# Patient Record
Sex: FEMALE | Race: WHITE | Employment: STUDENT | URBAN - METROPOLITAN AREA
[De-identification: names, ages, dates, MRNs, and addresses within clinical notes are randomized per-mention and may not be internally consistent; named-entity substitution may affect disease eponyms.]

---
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
## 2024-02-01 RX ORDER — DEXTROAMPHETAMINE SACCHARATE, AMPHETAMINE ASPARTATE MONOHYDRATE, DEXTROAMPHETAMINE SULFATE AND AMPHETAMINE SULFATE 2.5; 2.5; 2.5; 2.5 MG/1; MG/1; MG/1; MG/1
10 CAPSULE, EXTENDED RELEASE ORAL EVERY MORNING
Qty: 30 CAPSULE | Refills: 0 | OUTPATIENT
Start: 2024-02-01

## 2024-02-01 NOTE — TELEPHONE ENCOUNTER
Pharmacy    [facility] - [place] - [id number] [phone], [phone]        Disp Refills Start End    amphetamine-dextroamphetamine ER 10 MG Oral Capsule SR 24 Hr 30 capsule 0 1/31/2024 --    Sig - Route: Take 1 capsule (10 mg total) by mouth every morning. - Oral    Sent to pharmacy as: Amphetamine-Dextroamphet ER 10 MG Oral Capsule Extended Release 24 Hour (Adderall XR)    Earliest Fill Date: 1/31/2024    E-Prescribing Status: Receipt confirmed by pharmacy (1/31/2024  8:25 PM CST)

## 2024-02-26 DIAGNOSIS — F90.9 ATTENTION DEFICIT HYPERACTIVITY DISORDER (ADHD), UNSPECIFIED ADHD TYPE: ICD-10-CM

## 2024-02-27 RX ORDER — DEXTROAMPHETAMINE SACCHARATE, AMPHETAMINE ASPARTATE MONOHYDRATE, DEXTROAMPHETAMINE SULFATE AND AMPHETAMINE SULFATE 2.5; 2.5; 2.5; 2.5 MG/1; MG/1; MG/1; MG/1
10 CAPSULE, EXTENDED RELEASE ORAL EVERY MORNING
Qty: 30 CAPSULE | Refills: 0 | Status: SHIPPED | OUTPATIENT
Start: 2024-02-27 | End: 2024-03-04

## 2024-02-27 NOTE — TELEPHONE ENCOUNTER
This medication could not be e-scribed likely due to the nationwide [employer]  issue announced on 2/22/24.    Provider, please print the prescription instead.   Clinical staff, please let the patient know once the printed rx is available for  at your office.      Please review; protocol failed/no protocol.     Requested Prescriptions   Pending Prescriptions Disp Refills    amphetamine-dextroamphetamine ER 10 MG Oral Capsule SR 24 Hr 30 capsule 0     Sig: Take 1 capsule (10 mg total) by mouth every morning.       Controlled Substance Medication Failed - 2/26/2024 11:00 AM        Failed - This medication is a controlled substance - forward to provider to refill              Recent Outpatient Visits              4 months ago Attention deficit hyperactivity disorder (ADHD), unspecified ADHD type    [facility], [facility] Yovany Wang DO    Telemedicine    6 months ago Attention deficit hyperactivity disorder (ADHD), unspecified ADHD type    [facility], [facility] Yovany John DO    Telemedicine    9 months ago Examination, routine, over 18 years of age    [facility] John DO    Office Visit    1 year ago Drug rash    [facility], Chau [address] MD    Office Visit    1 year ago Pruritic rash    [facility] Yovany John DO    Office Visit

## 2024-03-01 DIAGNOSIS — F90.9 ATTENTION DEFICIT HYPERACTIVITY DISORDER (ADHD), UNSPECIFIED ADHD TYPE: ICD-10-CM

## 2024-03-04 RX ORDER — NORETHINDRONE ACETATE AND ETHINYL ESTRADIOL 1MG-20(21)
1 KIT ORAL DAILY
Qty: 84 TABLET | Refills: 3 | Status: SHIPPED | OUTPATIENT
Start: 2024-03-04

## 2024-03-04 RX ORDER — DEXTROAMPHETAMINE SACCHARATE, AMPHETAMINE ASPARTATE MONOHYDRATE, DEXTROAMPHETAMINE SULFATE AND AMPHETAMINE SULFATE 2.5; 2.5; 2.5; 2.5 MG/1; MG/1; MG/1; MG/1
10 CAPSULE, EXTENDED RELEASE ORAL EVERY MORNING
Qty: 30 CAPSULE | Refills: 0 | Status: SHIPPED | OUTPATIENT
Start: 2024-03-04

## 2024-03-04 NOTE — TELEPHONE ENCOUNTER
Please review. Protocol failed or has no protocol.  Patient 19 years old no pap on file.   Requested Prescriptions   Pending Prescriptions Disp Refills    AMPHETAMINE-DEXTROAMPHETAMINE ER 10 MG Oral Capsule SR 24 Hr [Pharmacy Med Name: Amphetamine-Dextroamphet ER 10 MG Capsule Extended Release 24 Hour] 30 capsule 0     Sig: Take 1 capsule (10 mg total) by mouth every morning.       Controlled Substance Medication Failed - 3/1/2024 10:19 AM        Failed - This medication is a controlled substance - forward to provider to refill          HIRAM ONEAL 1/20 1-20 MG-MCG Oral Tab [Pharmacy Med Name: Hiram FE 1/20 1-20 MG-MCG Tablet] 84 tablet 0     Sig: TAKE ONE TABLET BY MOUTH DAILY       Gynecology Medication Protocol Failed - 3/1/2024 10:19 AM        Failed - PASS-PENDING LAST PAP WNL--VIA MANUAL LOOKUP        Passed - Physical or Pelvic/Breast in past 12 or next 3 mos--VIA MANUAL LOOKUP             Recent Outpatient Visits              5 months ago Attention deficit hyperactivity disorder (ADHD), unspecified ADHD type    [facility], [facility] Yovany John DO    Telemedicine    7 months ago Attention deficit hyperactivity disorder (ADHD), unspecified ADHD type    [facility] Yovany John DO    Telemedicine    9 months ago Examination, routine, over 18 years of age    [facility]    Office Visit    1 year ago Drug rash    [facility], Chau [address] MD    Office Visit    1 year ago Pruritic rash    [facility]    Office Visit

## 2024-03-30 DIAGNOSIS — F90.9 ATTENTION DEFICIT HYPERACTIVITY DISORDER (ADHD), UNSPECIFIED ADHD TYPE: ICD-10-CM

## 2024-03-30 RX ORDER — BUPROPION HYDROCHLORIDE 300 MG/1
300 TABLET ORAL DAILY
Qty: 90 TABLET | Refills: 3 | OUTPATIENT
Start: 2024-03-30

## 2024-03-30 RX ORDER — DEXTROAMPHETAMINE SACCHARATE, AMPHETAMINE ASPARTATE MONOHYDRATE, DEXTROAMPHETAMINE SULFATE AND AMPHETAMINE SULFATE 2.5; 2.5; 2.5; 2.5 MG/1; MG/1; MG/1; MG/1
10 CAPSULE, EXTENDED RELEASE ORAL EVERY MORNING
Qty: 30 CAPSULE | Refills: 0 | OUTPATIENT
Start: 2024-03-30

## 2024-04-01 DIAGNOSIS — F90.9 ATTENTION DEFICIT HYPERACTIVITY DISORDER (ADHD), UNSPECIFIED ADHD TYPE: ICD-10-CM

## 2024-04-02 RX ORDER — DEXTROAMPHETAMINE SACCHARATE, AMPHETAMINE ASPARTATE MONOHYDRATE, DEXTROAMPHETAMINE SULFATE AND AMPHETAMINE SULFATE 2.5; 2.5; 2.5; 2.5 MG/1; MG/1; MG/1; MG/1
10 CAPSULE, EXTENDED RELEASE ORAL EVERY MORNING
Qty: 30 CAPSULE | Refills: 0 | Status: SHIPPED | OUTPATIENT
Start: 2024-04-02

## 2024-04-02 NOTE — TELEPHONE ENCOUNTER
[employer] message sent for clarification .     MEDICATION RECORD :     Disp Refills Start End    buPROPion  MG Oral Tablet 24 Hr 90 tablet 3 12/29/2023 --    Sig - Route: Take 1 tablet (300 mg total) by mouth daily. - Oral    Sent to pharmacy as: buPROPion HCl ER (XL) 300 MG Oral Tablet Extended Release 24 Hour (Wellbutrin XL)    E-Prescribing Status: Receipt confirmed by pharmacy (12/29/2023  3:54 PM CST)      Pharmacy    [facility] #[id number] [place] [id number] OF [address] [place] [address] AVE, [phone], [phone]

## 2024-04-02 NOTE — TELEPHONE ENCOUNTER
The patient called for an updated for her refill.       amphetamine-dextroamphetamine ER 10 MG Oral Capsule SR 24 Hr

## 2024-04-29 DIAGNOSIS — F90.9 ATTENTION DEFICIT HYPERACTIVITY DISORDER (ADHD), UNSPECIFIED ADHD TYPE: ICD-10-CM

## 2024-04-30 RX ORDER — DEXTROAMPHETAMINE SACCHARATE, AMPHETAMINE ASPARTATE MONOHYDRATE, DEXTROAMPHETAMINE SULFATE AND AMPHETAMINE SULFATE 2.5; 2.5; 2.5; 2.5 MG/1; MG/1; MG/1; MG/1
10 CAPSULE, EXTENDED RELEASE ORAL EVERY MORNING
Qty: 30 CAPSULE | Refills: 0 | Status: SHIPPED | OUTPATIENT
Start: 2024-04-30

## 2024-04-30 RX ORDER — DEXTROAMPHETAMINE SACCHARATE, AMPHETAMINE ASPARTATE MONOHYDRATE, DEXTROAMPHETAMINE SULFATE AND AMPHETAMINE SULFATE 2.5; 2.5; 2.5; 2.5 MG/1; MG/1; MG/1; MG/1
10 CAPSULE, EXTENDED RELEASE ORAL EVERY MORNING
Qty: 30 CAPSULE | Refills: 0 | OUTPATIENT
Start: 2024-04-30

## 2024-04-30 RX ORDER — BUPROPION HYDROCHLORIDE 300 MG/1
300 TABLET ORAL DAILY
Qty: 90 TABLET | Refills: 3 | Status: SHIPPED | OUTPATIENT
Start: 2024-04-30

## 2024-04-30 NOTE — TELEPHONE ENCOUNTER
Please review. Protocol Failed; No Protocol    Recent fills: 2/1/2024, 3/4/2024, 4/2/2024  Last Rx written: 4/2/2024  Last office visit: 5/22/2023 Televisit: 10/3/2024      Requested Prescriptions   Pending Prescriptions Disp Refills    AMPHETAMINE-DEXTROAMPHETAMINE ER 10 MG Oral Capsule SR 24 Hr [Pharmacy Med Name: Amphetamine-Dextroamphet ER 10 MG Capsule Extended Release 24 Hour] 30 capsule 0     Sig: TAKE 1 CAPSULE BY MOUTH EVERY MORNING.       Controlled Substance Medication Failed - 4/29/2024 12:11 PM        Failed - This medication is a controlled substance - forward to provider to refill          BUPROPION  MG Oral Tablet 24 Hr [Pharmacy Med Name: buPROPion HCl ER (XL) 300 MG Tablet Extended Release 24 Hour] 90 tablet 0     Sig: Take 1 tablet (300 mg total) by mouth daily.       Psychiatric Non-Scheduled (Anti-Anxiety) Failed - 4/29/2024 12:11 PM        Failed - In person appointment or virtual visit in the past 6 mos or appointment in next 3 mos     Recent Outpatient Visits              7 months ago Attention deficit hyperactivity disorder (ADHD), unspecified ADHD type    [facility] Yovany Wang DO    Telemedicine    9 months ago Attention deficit hyperactivity disorder (ADHD), unspecified ADHD type    [facility] Yovany Wang DO    Telemedicine    11 months ago Examination, routine, over 18 years of age    [facility] Yovany John DO    Office Visit    2 years ago Drug rash    [facility], Chau Way MD    Office Visit    2 years ago Pruritic rash    [facility] Yovany John DO    Office Visit                      Passed - Depression Screening completed within the past 12 months                 Recent Outpatient Visits              7 months ago Attention deficit  hyperactivity disorder (ADHD), unspecified ADHD type    [facility], [facility] Yovany Wang DO    Telemedicine    9 months ago Attention deficit hyperactivity disorder (ADHD), unspecified ADHD type    [facility], [facility] Yovany Wang DO    Telemedicine    11 months ago Examination, routine, over 18 years of age    [facility], [facility] Yovany John DO    Office Visit    2 years ago [facility]    Office Visit    2 years ago Pruritic rash    [facility], [facility] Yovany Wang DO    Office Visit

## 2024-05-23 ENCOUNTER — NURSE TRIAGE (OUTPATIENT)
Facility: CLINIC | Age: 19
End: 2024-05-23

## 2024-05-23 NOTE — TELEPHONE ENCOUNTER
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  Had been seeing a chiropractor for two months, starting in March.  States had xrays done.  Reports was told she had \"slipped disc and pinched nerve.\"  For past 2 weeks has been experiencing numbness going down right leg. Denies bowel or bladder difficulty.  Took Tylenol for pain and pain has diminished but now concerned about numbness.  Never saw a medical doctor for the symptoms.  She is at work presently and can't come in for appointment.  Works into evening.  Plan:  Office visit scheduled.  Discussed Immediate Care hours if symptoms worsen.  Patient stated understanding.    Future Appointments   Date Time Provider Department Center   5/24/2024  8:30 AM N[facility] Knight [facility] [place]     Reason for call: Acute \"Pinched nerve,\" and numbness going down right leg  Onset: numbness x 2 weeks    Reason for Disposition   Numbness in a leg or foot (i.e., loss of sensation)    Protocols used: Back Pain-A-[place]

## 2024-05-23 NOTE — TELEPHONE ENCOUNTER
Please see below, message sent by patient regarding symptoms.     From: Megan Nevesnt: 5/22/2024  10:43 PM CDTTo: Emmg 10  StaffSubject: Appointment Request ()                    Appointment Request From: Yayn Sin Provider: Yovany Wang [-Primary Care Physician-]Preferred Date Range: From 5/22/2024 To 5/31/2024Preferred Times: AnyReason:         To address the following health maintenance concerns.Annual PhysicalComments:I slipped a lower disc in my back just after spring break. Went to a chiropractor twice a week every week, until I back home from college recently around the middle of May. It's a lot better and the nerve is unpinched, but still can't work out or do anything remotely high-impact, I also still have some numbness down right leg. Have some chiro X-Ray photos. Want second opinion and hopefully get approved for PT so I can finally strengthen it back to functioning. Would really love to get in ASAP, thank you I appreciate it!

## 2024-05-24 ENCOUNTER — HOSPITAL ENCOUNTER (OUTPATIENT)
Dept: GENERAL RADIOLOGY | Age: 19
Discharge: HOME | End: 2024-05-24
Attending: PHYSICIAN ASSISTANT

## 2024-05-24 ENCOUNTER — OFFICE VISIT (OUTPATIENT)
Dept: FAMILY MEDICINE CLINIC | Facility: CLINIC | Age: 19
End: 2024-05-24

## 2024-05-24 VITALS
WEIGHT: 153 LBS | HEART RATE: 93 BPM | DIASTOLIC BLOOD PRESSURE: 77 MMHG | BODY MASS INDEX: 21.66 KG/M2 | SYSTOLIC BLOOD PRESSURE: 119 MMHG | HEIGHT: 70.5 IN

## 2024-05-24 DIAGNOSIS — M54.31 BACK PAIN WITH RIGHT-SIDED SCIATICA: Primary | ICD-10-CM

## 2024-05-24 DIAGNOSIS — M54.31 BACK PAIN WITH RIGHT-SIDED SCIATICA: ICD-10-CM

## 2024-05-24 PROCEDURE — 72110 X-RAY EXAM L-2 SPINE 4/>VWS: CPT | Performed by: PHYSICIAN ASSISTANT

## 2024-05-24 PROCEDURE — 72072 X-RAY EXAM THORAC SPINE 3VWS: CPT | Performed by: PHYSICIAN ASSISTANT

## 2024-05-24 NOTE — PROGRESS NOTES
HPI:     HPI  A 19-year-old female is here in the office complaining of back pain for the past 2 months. The patient hurt her back when lifting weight. The patient has been seeing Chiropractor for the past 2 months with mild relief. The pain radiates down the right leg. The patient has tried Tylenol and Advil with relief. The patient feels numb on the toes.    Medications:     Current Outpatient Medications   Medication Sig Dispense Refill    amphetamine-dextroamphetamine ER 10 MG Oral Capsule SR 24 Hr Take 1 capsule (10 mg total) by mouth every morning. 30 capsule 0    buPROPion  MG Oral Tablet 24 Hr Take 1 tablet (300 mg total) by mouth daily. 90 tablet 3    Norethin Ace-Eth Estrad-FE (HIRAM FE 1/20) 1-20 MG-MCG Oral Tab Take 1 tablet by mouth daily. 84 tablet 3       Allergies:     Allergies   Allergen Reactions    Amoxicillin RASH    Citalopram PAIN     \"random chest pains\"       History:     Health Maintenance   Topic Date Due    Chlamydia Screening  Never done    COVID-19 Vaccine (3 - 2023-24 season) 09/01/2023    Annual Depression Screening  01/01/2024    Annual Physical  05/22/2024    Influenza Vaccine (Season Ended) 10/01/2024    DTaP,Tdap,and Td Vaccines (7 - Td or Tdap) 05/14/2025    Pneumococcal Vaccine: Birth to 64yrs  Completed    Hepatitis B Vaccines  Completed    Hepatitis A Vaccines  Completed    MMR Vaccines  Completed    Varicella Vaccines  Completed    Meningococcal Vaccine  Completed    HPV Vaccines  Completed       No LMP recorded. (Menstrual status: Continuous Pill).   Past Medical History:     Past Medical History:    Allergic rhinitis    Anxiety    Attention deficit hyperactivity disorder (ADHD)    Depression    History of COVID-19       Past Surgical History:     Past Surgical History:   Procedure Laterality Date    Other surgical history         Family History:     Family History   Problem Relation Age of Onset    Depression Mother     Anemia Mother     Diabetes Paternal Aunt         Social History:     Social History     Socioeconomic History    Marital status: Single     Spouse name: Not on file    Number of children: Not on file    Years of education: Not on file    Highest education level: Not on file   Occupational History    Not on file   Tobacco Use    Smoking status: Never    Smokeless tobacco: Never    Tobacco comments:     Patient denies passive smoke exposure in the home   Vaping Use    Vaping status: Never Used   Substance and Sexual Activity    Alcohol use: Never    Drug use: Never    Sexual activity: Not on file   Other Topics Concern    Caffeine Concern No    Exercise No    Seat Belt No    Special Diet No    Stress Concern No    Weight Concern No   Social History Narrative    Not on file     Social Determinants of Health     Financial Resource Strain: Not on file   Food Insecurity: Not on file   Transportation Needs: Not on file   Physical Activity: Not on file   Stress: Not on file   Social Connections: Not on file   Housing Stability: At Risk (8/18/2023)    Received from [facility]     Living Situation: Not on file     Housing Problems: Not on file       Review of Systems:   Review of Systems   Musculoskeletal:  Positive for back pain.        Vitals:    05/24/24 0847   BP: 119/77   Pulse: 93   Weight: 153 lb (69.4 kg)   Height: 5' 10.5\" (1.791 m)     Body mass index is 21.64 kg/m².    Physical Exam:   Physical Exam  Vitals reviewed.      Inspection: Neck and spine have no noted deformities or signs of inflammation. Curvature of cervical, thoracic, and lumbar spine are within normal limits. Posture is upright, and gait is smooth and normal.  Palpation: Spinous processed of C7-L5 palpable, midline, and non- tender.   Negative straight leg raising.      Assessment and Plan::     Problem List Items Addressed This Visit    None  Visit Diagnoses       Back pain with right-sided sciatica    -  Primary    Relevant Orders    Physical Therapy Referral - Tin  Locations    XR LUMBAR SPINE (MIN 4 VIEWS) (CPT=72110) (Completed)    XR THORACIC SPINE (3 VIEWS) (CPT=72072) (Completed)        Continue with Tylenol and Advil as needed for pain.  May refer to Ortho if symptom persists.    Discussed plan of care with pt and pt is in agreement.All questions answered. Pt to call with questions or concerns.

## 2024-05-29 DIAGNOSIS — F90.9 ATTENTION DEFICIT HYPERACTIVITY DISORDER (ADHD), UNSPECIFIED ADHD TYPE: ICD-10-CM

## 2024-05-31 RX ORDER — BUPROPION HYDROCHLORIDE 300 MG/1
300 TABLET ORAL DAILY
Qty: 90 TABLET | Refills: 0 | Status: SHIPPED | OUTPATIENT
Start: 2024-05-31

## 2024-05-31 RX ORDER — DEXTROAMPHETAMINE SACCHARATE, AMPHETAMINE ASPARTATE MONOHYDRATE, DEXTROAMPHETAMINE SULFATE AND AMPHETAMINE SULFATE 2.5; 2.5; 2.5; 2.5 MG/1; MG/1; MG/1; MG/1
10 CAPSULE, EXTENDED RELEASE ORAL EVERY MORNING
Qty: 30 CAPSULE | Refills: 0 | Status: SHIPPED | OUTPATIENT
Start: 2024-06-03

## 2024-05-31 NOTE — TELEPHONE ENCOUNTER
Patient would like the prescriptions resent to a different pharmacy.  Patient is on last pill.       [facility] #[id number] - [place], [place] - [address] AT [address] OF [address] [facility], [phone], [phone]

## 2024-05-31 NOTE — TELEPHONE ENCOUNTER
Please review.  Protocol failed / Has no protocol.    Recent fills : 3/4, 4/2, 5/1, due Ayanna 3  Last prescription written: 4/30/2024  Last office visit: 5/22/2023, televisit 10/3/2023    [employer] message sent to patient to schedule an office visit with Primary Care Provider.        Requested Prescriptions   Pending Prescriptions Disp Refills    amphetamine-dextroamphetamine ER 10 MG Oral Capsule SR 24 Hr 30 capsule 0     Sig: Take 1 capsule (10 mg total) by mouth every morning.       Controlled Substance Medication Failed - 5/31/2024 10:56 AM        Failed - This medication is a controlled substance - forward to provider to refill          buPROPion  MG Oral Tablet 24 Hr 90 tablet 3     Sig: Take 1 tablet (300 mg total) by mouth daily.       Psychiatric Non-Scheduled (Anti-Anxiety) Failed - 5/31/2024 10:56 AM        Failed - In person appointment or virtual visit in the past 6 mos or appointment in next 3 mos     Recent Outpatient Visits              1 week ago Back pain with right-sided sciatica    [facility], [address], [place] Nydia Knight PA-C    Office Visit    8 months ago Attention deficit hyperactivity disorder (ADHD), unspecified ADHD type    [facility] Yovany Wang DO    Telemedicine    10 months ago Attention deficit hyperactivity disorder (ADHD), unspecified ADHD type    [facility] Yovany Wang DO    Telemedicine    1 year ago Examination, routine, over 18 years of age    [facility] Yovany Wang DO    Office Visit    2 years ago Drug rash    [facility], Chau Way MD    Office Visit                      Failed - Depression Screening completed within the past 12 months             Recent Outpatient Visits              1 week ago Back pain with right-sided sciatica    [facility], [facility] [place] Nguyen, Minhxuyen, PA-C    Office Visit    8 months ago Attention deficit hyperactivity disorder (ADHD), unspecified ADHD type    [facility] Yovany Wang DO    Telemedicine    10 months ago Attention deficit hyperactivity disorder (ADHD), unspecified ADHD type    [facility], [facility] Yovany Wang DO    Telemedicine    1 year ago Examination, routine, over 18 years of age    [facility] Yovany Wang DO    Office Visit    2 years ago Drug rash    [facility], Chau Way MD    Office Visit

## 2024-06-04 ENCOUNTER — TELEPHONE (OUTPATIENT)
Facility: CLINIC | Age: 19
End: 2024-06-04

## 2024-06-04 NOTE — TELEPHONE ENCOUNTER
Patient is requesting refill on the following medication. Please assist.      Norethin Ace-Eth Estrad-FE ([facility] 1/20) 1-20 MG-MCG Oral Tab     [facility] #[id number] - [place] [id number], [phone], [phone]      You can access the [facility] Patient Portal, offered by [facility], by registering with the following website: [email or web address]

## 2024-06-04 NOTE — TELEPHONE ENCOUNTER
No pap smear result on file.     Protocol Failed/ No Protocol    Requested Prescriptions   Pending Prescriptions Disp Refills    Norethin Ace-Eth Estrad-FE (HIRAM JM 1/20) 1-20 MG-MCG Oral Tab 84 tablet 3     Sig: Take 1 tablet by mouth daily.       Gynecology Medication Protocol Failed - 6/4/2024 11:27 AM        Failed - Physical or Pelvic/Breast in past 12 or next 3 mos--VIA MANUAL LOOKUP               Recent Outpatient Visits              1 week ago Back pain with right-sided sciatica    [facility], [facility], [place] Nguyen, Minhxuyen, PA-C    Office Visit    8 months ago Attention deficit hyperactivity disorder (ADHD), unspecified ADHD type    [facility] Yovany Wang DO    Telemedicine    10 months ago Attention deficit hyperactivity disorder (ADHD), unspecified ADHD type    [facility] Yovany Wang DO    Telemedicine    1 year ago Examination, routine, over 18 years of age    [facility] Yovany Wang DO    Office Visit    2 years ago Drug rash    [facility], Chau Way MD    Office Visit

## 2024-06-05 RX ORDER — NORETHINDRONE ACETATE AND ETHINYL ESTRADIOL 1MG-20(21)
1 KIT ORAL DAILY
Qty: 84 TABLET | Refills: 0 | Status: CANCELLED | OUTPATIENT
Start: 2024-06-05

## 2024-06-05 RX ORDER — NORETHINDRONE ACETATE AND ETHINYL ESTRADIOL 1MG-20(21)
1 KIT ORAL DAILY
Qty: 84 TABLET | Refills: 0 | Status: SHIPPED | OUTPATIENT
Start: 2024-06-05 | End: 2024-06-05

## 2024-06-05 RX ORDER — NORETHINDRONE ACETATE AND ETHINYL ESTRADIOL 1MG-20(21)
1 KIT ORAL DAILY
Qty: 84 TABLET | Refills: 0 | Status: SHIPPED | OUTPATIENT
Start: 2024-06-05

## 2024-06-05 NOTE — TELEPHONE ENCOUNTER
Patient calling to verify if prescription was sent to [facility]. Edited pharmacy and signed.

## 2024-06-05 NOTE — TELEPHONE ENCOUNTER
Spoke to patient. Prescription sent to wrong pharmacy. Edited pharmacy preference and sent to [facility].      Disp Refills Start End    Norethin Ace-Eth Estrad-FE ([facility] 1/20) 1-20 MG-MCG Oral Tab 84 tablet 0 6/5/2024 --    Sig - Route: Take 1 tablet by mouth daily. - Oral    Sent to pharmacy as: Norethin Ace-Eth Estrad-FE 1-20 MG-MCG Oral Tablet ([facility] 1/20)    Notes to Pharmacy: FYI appointment needed for further refills.    E-Prescribing Status: Receipt confirmed by pharmacy (6/5/2024  9:25 AM CDT)      Pharmacy    [facility] #[id number] [facility] [address] AT [address] OF [address] [place] [address] AVE, [phone], [phone]

## 2024-06-05 NOTE — TELEPHONE ENCOUNTER
The patient called about her birth control. She stated it was sent to the wrong pharmacy. It was supposed to go to the [facility] location in [facility]. The patient wants to  the medication before 10:00 am if possible.

## 2024-06-29 DIAGNOSIS — F90.9 ATTENTION DEFICIT HYPERACTIVITY DISORDER (ADHD), UNSPECIFIED ADHD TYPE: ICD-10-CM

## 2024-07-01 ENCOUNTER — TELEPHONE (OUTPATIENT)
Facility: CLINIC | Age: 19
End: 2024-07-01

## 2024-07-01 DIAGNOSIS — F90.9 ATTENTION DEFICIT HYPERACTIVITY DISORDER (ADHD), UNSPECIFIED ADHD TYPE: ICD-10-CM

## 2024-07-01 RX ORDER — BUPROPION HYDROCHLORIDE 300 MG/1
300 TABLET ORAL DAILY
Qty: 90 TABLET | Refills: 0 | OUTPATIENT
Start: 2024-07-01

## 2024-07-01 NOTE — TELEPHONE ENCOUNTER
Please review.  Protocol failed / Has no protocol.    Marked High Priority, patient states out of medication    Adderall ER 10mg Recent fills each quantity 30 : 3/4, 4/2, 5/1, 6/1  Last prescription written: 5/31/24  Last office visit: 5/22/2023    [employer] message sent to patient to schedule an office visit with Primary Care Provider.   Will also route to office to make a phone attempt.     Requested Prescriptions   Pending Prescriptions Disp Refills    amphetamine-dextroamphetamine ER 10 MG Oral Capsule SR 24 Hr 30 capsule 0     Sig: Take 1 capsule (10 mg total) by mouth every morning. **Appointment needed for further refills.       Controlled Substance Medication Failed - 7/1/2024 12:21 PM        Failed - This medication is a controlled substance - forward to provider to refill      Refused Prescriptions       buPROPion  MG Oral Tablet 24 Hr 90 tablet 0     Sig: Take 1 tablet (300 mg total) by mouth daily. **Appointment needed for further refills.       Psychiatric Non-Scheduled (Anti-Anxiety) Failed - 7/1/2024 12:21 PM        Failed - In person appointment or virtual visit in the past 6 mos or appointment in next 3 mos     Recent Outpatient Visits              1 month ago Back pain with right-sided sciatica    [facility], [place], Minhxuyen, PA-C    Office Visit    9 months ago Attention deficit hyperactivity disorder (ADHD), unspecified ADHD type    [facility] Yovany Wang DO    Telemedicine    11 months ago Attention deficit hyperactivity disorder (ADHD), unspecified ADHD type    [facility] Yovany Wang DO    Telemedicine    1 year ago Examination, routine, over 18 years of age    [facility] Yovany Wang DO    Office Visit    2 years ago Drug rash    [facility], Chau Way MD     Office Visit                      Failed - Depression Screening completed within the past 12 months             Recent Outpatient Visits              1 month ago Back pain with right-sided sciatica    [facility], [facility], [place], Minhxuyen, PA-C    Office Visit    9 months ago Attention deficit hyperactivity disorder (ADHD), unspecified ADHD type    [facility] Yovany Wang,     Telemedicine    11 months ago Attention deficit hyperactivity disorder (ADHD), unspecified ADHD type    [facility] Yovany Wang,     Telemedicine    1 year ago Examination, routine, over 18 years of age    [facility] Yovany Wang DO    Office Visit    2 years ago Drug rash    [facility], [facility], Chau Way MD    Office Visit        Bupropion has refills on file at [facility], one is ready for pickup 7/1/24

## 2024-07-01 NOTE — TELEPHONE ENCOUNTER
Nicci Landrum  Signed 9:43 AM       Patient is requesting refill on the following medication. Please assist.        amphetamine-dextroamphetamine ER 10 MG Oral Capsule SR 24 Hr      buPROPion  MG Oral Tablet 24 Hr      [id number] [place], [place] - [facility], [phone], [phone]

## 2024-07-01 NOTE — TELEPHONE ENCOUNTER
Patient is requesting refill on the following medication. Please assist.      amphetamine-dextroamphetamine ER 10 MG Oral Capsule SR 24 Hr     buPROPion  MG Oral Tablet 24 Hr     [facility] #[id number] - [place] [id number], [phone], [phone]

## 2024-07-02 RX ORDER — DEXTROAMPHETAMINE SACCHARATE, AMPHETAMINE ASPARTATE MONOHYDRATE, DEXTROAMPHETAMINE SULFATE AND AMPHETAMINE SULFATE 2.5; 2.5; 2.5; 2.5 MG/1; MG/1; MG/1; MG/1
10 CAPSULE, EXTENDED RELEASE ORAL EVERY MORNING
Qty: 30 CAPSULE | Refills: 0 | OUTPATIENT
Start: 2024-07-02

## 2024-07-02 RX ORDER — DEXTROAMPHETAMINE SACCHARATE, AMPHETAMINE ASPARTATE MONOHYDRATE, DEXTROAMPHETAMINE SULFATE AND AMPHETAMINE SULFATE 2.5; 2.5; 2.5; 2.5 MG/1; MG/1; MG/1; MG/1
10 CAPSULE, EXTENDED RELEASE ORAL EVERY MORNING
Qty: 7 CAPSULE | Refills: 0 | Status: SHIPPED | OUTPATIENT
Start: 2024-07-02 | End: 2024-07-08

## 2024-07-02 NOTE — TELEPHONE ENCOUNTER
She was due for f/u in 12/2023 per my last note as we adjusted her dose. Given adderall is a controlled substance, regular monitoring is required. I will provide a refill to get her through to her appointment, but please reiterate I cannot provide refills moving forward if overdue for appointment. Thank you.

## 2024-07-02 NOTE — TELEPHONE ENCOUNTER
Received a call from patient and schedule for 07/8/24 for  in person appointment ,base on  notes  .  Patient stated she is complete out of the medication and  is requesting a refill .    Please advice .

## 2024-07-02 NOTE — TELEPHONE ENCOUNTER
Patient scheduled a video visit made for 7/8/2024 at 9:00am with Dr Gu. Went over instructions, copay and that provider will be calling from a restricted/unknown number to make sure able to accept/pickup those calls. Patient agreed.     Offered appointment with Dr Wang but declined since it did not work out with patient's schedule. Patient is out of adderall. Wanted to know if medication can be refilled in the meantime?

## 2024-07-02 NOTE — TELEPHONE ENCOUNTER
Spoke with patient's mother,  Date of Birth verified  She is requesting Adderall refill for patient. She is concerned that Dr Wang might not reply on time due ot Holidays and patient is helping a withdrawal.   She was advised if patient is having any medical crisis or withdrawal to go to ER for eval, she stated patient only need medication refill.   She was informed message was sent to Dr Wang 3 mins ago.   She  stated understanding.

## 2024-07-02 NOTE — TELEPHONE ENCOUNTER
I recommend in-office visit as it has been >1 year and need to check BP. I can add her on this week if there's a better day/time. Please offer. Needs appt prior to refill.

## 2024-07-02 NOTE — TELEPHONE ENCOUNTER
Left Voicemail to call back our office. Office phone number provided with office telephone hours.        ( See Dr Wang's note below for sooner appointment )

## 2024-07-02 NOTE — TELEPHONE ENCOUNTER
Dr Wang      Please see below for refill     Future Appointments   Date Time Provider Department Center   7/8/2024 10:00 AM Yovany Wang,  [facility]

## 2024-07-03 NOTE — TELEPHONE ENCOUNTER
Noted.       Future Appointments   Date Time Provider Department Center   7/8/2024 10:00 AM Yovany Wang DO [facility]

## 2024-07-08 ENCOUNTER — OFFICE VISIT (OUTPATIENT)
Facility: CLINIC | Age: 19
End: 2024-07-08
Payer: COMMERCIAL

## 2024-07-08 VITALS
HEART RATE: 99 BPM | BODY MASS INDEX: 21.66 KG/M2 | HEIGHT: 70.5 IN | SYSTOLIC BLOOD PRESSURE: 116 MMHG | DIASTOLIC BLOOD PRESSURE: 70 MMHG | WEIGHT: 153 LBS | OXYGEN SATURATION: 98 %

## 2024-07-08 DIAGNOSIS — F32.A ANXIETY AND DEPRESSION: ICD-10-CM

## 2024-07-08 DIAGNOSIS — Z71.3 ENCOUNTER FOR DIETARY COUNSELING AND SURVEILLANCE: ICD-10-CM

## 2024-07-08 DIAGNOSIS — F41.9 ANXIETY AND DEPRESSION: ICD-10-CM

## 2024-07-08 DIAGNOSIS — F90.9 ATTENTION DEFICIT HYPERACTIVITY DISORDER (ADHD), UNSPECIFIED ADHD TYPE: ICD-10-CM

## 2024-07-08 DIAGNOSIS — Z00.00 EXAMINATION, ROUTINE, OVER 18 YEARS OF AGE: Primary | ICD-10-CM

## 2024-07-08 DIAGNOSIS — Z71.82 EXERCISE COUNSELING: ICD-10-CM

## 2024-07-08 RX ORDER — CLINDAMYCIN PHOSPHATE 10 MG/G
GEL TOPICAL
COMMUNITY
Start: 2024-06-28

## 2024-07-08 RX ORDER — DEXTROAMPHETAMINE SACCHARATE, AMPHETAMINE ASPARTATE MONOHYDRATE, DEXTROAMPHETAMINE SULFATE AND AMPHETAMINE SULFATE 2.5; 2.5; 2.5; 2.5 MG/1; MG/1; MG/1; MG/1
10 CAPSULE, EXTENDED RELEASE ORAL EVERY MORNING
Qty: 30 CAPSULE | Refills: 0 | Status: SHIPPED | OUTPATIENT
Start: 2024-07-08

## 2024-07-08 RX ORDER — DOXYCYCLINE HYCLATE 100 MG/1
100 CAPSULE ORAL 2 TIMES DAILY
COMMUNITY
Start: 2024-06-28

## 2024-07-08 NOTE — PROGRESS NOTES
Subjective:   Yany Neves is a 19 year old female who was brought in for her Medication Follow-Up (Adderall f/u) visit.    History was provided by patient     ADHD: Still taking adderall ER 10mg daily. Working well. Tolerating well. Of note, she did not tolerate 15mg dose.     Anxiety & depression: Still taking wellbutrin 300mg daily. Anxiety and depression remains well-controlled and she prefers to continue.     Otherwise doing well.   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   Exercise regularly at school. Diet is good.     History/Other:     She  has a past medical history of Allergic rhinitis, Anxiety, Attention deficit hyperactivity disorder (ADHD), Depression, and History of COVID-19 (04/2021).   She  has a past surgical history that includes other surgical history.  Her family history includes Anemia in her mother; Depression in her mother; Diabetes in her paternal aunt.  She has a current medication list which includes the following prescription(s): doxycycline, clindamycin phosphate, amphetamine-dextroamphetamine er, norethin ace-eth estrad-fe, and bupropion er.    Chief Complaint Reviewed and Verified  Nursing Notes Reviewed and   Verified               PHQ-2 SCORE: 0  , done 7/8/2024         TB Screening Needed? : No    Review of Systems  As documented in HPI    Child/teen diet: varied diet     Elimination: no concerns    Sleep: sleeps for about 5-6 hours at night    Dental: normal for age    Development:  Current grade level:  College       Objective:   Blood pressure 116/70, pulse 99, height 5' 10.5\" (1.791 m), weight 153 lb (69.4 kg), SpO2 98%, not currently breastfeeding.   BMI for age is 0%.  Physical Exam      Constitutional: appears well hydrated, alert and responsive, no acute distress noted  Head/Face: Normocephalic, atraumatic  Eye:Pupils equal, round, reactive to light and tracks symmetrically   Ears/Hearing: normal shape and position  ear canal and TM normal bilaterally  Nose: nares  normal, no discharge  Mouth/Throat: oropharynx is normal, mucus membranes are moist  no oral lesions or erythema  Neck/Thyroid: supple, no lymphadenopathy   Breast Exam : deferred   Respiratory: normal to inspection, clear to auscultation bilaterally   Cardiovascular: regular rate and rhythm, no murmur  Vascular: well perfused and peripheral pulses equal  Abdomen:non distended, normal bowel sounds, no hepatosplenomegaly, no masses  Skin/Hair: no rash, no abnormal bruising  Back/Spine: no abnormalities and no scoliosis  Musculoskeletal: no deformities, full ROM of all extremities  Extremities: no deformities, pulses equal upper and lower extremities  Neurologic: exam appropriate for age, reflexes grossly normal for age, and motor skills grossly normal for age  Psychiatric: behavior appropriate for age    Assessment & Plan:   Examination, routine, over 18 years of age (Primary)  Exercise counseling  Encounter for dietary counseling and surveillance  Attention deficit hyperactivity disorder (ADHD), unspecified ADHD type  -     Amphetamine-Dextroamphet ER; Take 1 capsule (10 mg total) by mouth every morning.  Dispense: 30 capsule; Refill: 0  Anxiety and depression    -ADHD: Well-controlled. CPM with adderall ER 10mg daily. Rx sent for 30 days. She will update via [employer] her preferred location for the next refill pending the timing of her departure for school.   -Anxiety & depression: Well-controlled. CPM with wellbutrin 300mg daily.   -Vaccines UTD.   -RTC during holiday break in 12/2024 for ADHD f/u. To call/message sooner prn.          Return in about 6 months (around 1/8/2025) for ADHD f/u.

## 2024-07-28 DIAGNOSIS — F90.9 ATTENTION DEFICIT HYPERACTIVITY DISORDER (ADHD), UNSPECIFIED ADHD TYPE: ICD-10-CM

## 2024-07-31 RX ORDER — DEXTROAMPHETAMINE SACCHARATE, AMPHETAMINE ASPARTATE MONOHYDRATE, DEXTROAMPHETAMINE SULFATE AND AMPHETAMINE SULFATE 2.5; 2.5; 2.5; 2.5 MG/1; MG/1; MG/1; MG/1
10 CAPSULE, EXTENDED RELEASE ORAL EVERY MORNING
Qty: 30 CAPSULE | Refills: 0 | Status: SHIPPED | OUTPATIENT
Start: 2024-08-05

## 2024-07-31 NOTE — TELEPHONE ENCOUNTER
Please Review. Protocol Failed; No Protocol   Patient Comment: At my last appt Dr Donohue told me to message him and let him know if I would need one last refill before college, and I will need another refill at the [facility] here in [place] because I will run out before I leave. Just wanted to let you know in advance, I still have 11 pills left before I run out!       Recent fills: Quantity: 30  07/08/2024 07/02/2024 06/01/2024 05/01/2024 04/02/2024                                                                      Patient is due: 08/08/2024  Last Rx written: 07/08/2024  Last Office Visit: 07/08/2024  Future Appointments   Date Time Provider Department Center   12/27/2024  9:00 AM Yovany Wang DO [facility]           Requested Prescriptions   Pending Prescriptions Disp Refills    amphetamine-dextroamphetamine ER 10 MG Oral Capsule SR 24 Hr 30 capsule 0     Sig: Take 1 capsule (10 mg total) by mouth every morning.       Controlled Substance Medication Failed - 7/28/2024  5:38 PM        Failed - This medication is a controlled substance - forward to provider to refill               Future Appointments         Provider Department Appt Notes    In 4 months Yovany Wang DO [facility] medication follow up          Recent Outpatient Visits              3 weeks ago Examination, routine, over 18 years of age    [facility] Yovany Wang DO    Office Visit    2 months ago Back pain with right-sided sciatica    [facility], [place], Minhxuyen, PA-C    Office Visit    10 months ago Attention deficit hyperactivity disorder (ADHD), unspecified ADHD type    [facility] Yovany Wang DO    Telemedicine    12 months ago Attention deficit hyperactivity disorder (ADHD), unspecified ADHD type    [facility]  Yovany Wang, DO    Telemedicine    1 year ago Examination, routine, over 18 years of age    [facility], [facility] Yovany Wang, DO    Office Visit

## 2024-08-05 RX ORDER — NORETHINDRONE ACETATE AND ETHINYL ESTRADIOL 1MG-20(21)
1 KIT ORAL DAILY
Qty: 84 TABLET | Refills: 3 | Status: SHIPPED | OUTPATIENT
Start: 2024-08-05

## 2024-08-05 NOTE — TELEPHONE ENCOUNTER
Refill passed per [facility] protocol, however, no PAP on file. Please advise. thanks    Requested Prescriptions   Pending Prescriptions Disp Refills    Norethin Ace-Eth Estrad-FE (HIRAM ONEAL 1/20) 1-20 MG-MCG Oral Tab 84 tablet 0     Sig: Take 1 tablet by mouth daily.       Gynecology Medication Protocol Passed - 8/3/2024  1:55 PM        Passed - Physical or Pelvic/Breast in past 12 or next 3 mos--VIA MANUAL LOOKUP           Recent Outpatient Visits              4 weeks ago Examination, routine, over 17 years of age    [facility] Yovany Wang DO    Office Visit    2 months ago Back pain with right-sided sciatica    [facility], [place], Minhxuyen, PA-C    Office Visit    10 months ago Attention deficit hyperactivity disorder (ADHD), unspecified ADHD type    [facility] Yovany Wang DO    Telemedicine    1 year ago Attention deficit hyperactivity disorder (ADHD), unspecified ADHD type    [facility] Yovany Wang DO    Telemedicine    1 year ago Examination, routine, over 17 years of age    [facility] Yovany Wang DO    Office Visit          Future Appointments         Provider Department Appt Notes    In 4 months Yovany Wang DO [facility] medication follow up

## 2024-08-20 DIAGNOSIS — F90.9 ATTENTION DEFICIT HYPERACTIVITY DISORDER (ADHD), UNSPECIFIED ADHD TYPE: ICD-10-CM

## 2024-08-21 RX ORDER — DEXTROAMPHETAMINE SACCHARATE, AMPHETAMINE ASPARTATE MONOHYDRATE, DEXTROAMPHETAMINE SULFATE AND AMPHETAMINE SULFATE 2.5; 2.5; 2.5; 2.5 MG/1; MG/1; MG/1; MG/1
10 CAPSULE, EXTENDED RELEASE ORAL EVERY MORNING
Qty: 30 CAPSULE | Refills: 0 | Status: SHIPPED | OUTPATIENT
Start: 2024-08-21

## 2024-09-21 DIAGNOSIS — F90.9 ATTENTION DEFICIT HYPERACTIVITY DISORDER (ADHD), UNSPECIFIED ADHD TYPE: ICD-10-CM

## 2024-09-24 RX ORDER — BUPROPION HYDROCHLORIDE 300 MG/1
300 TABLET ORAL DAILY
Qty: 90 TABLET | Refills: 3 | Status: SHIPPED | OUTPATIENT
Start: 2024-09-24 | End: 2024-12-27

## 2024-09-24 RX ORDER — DEXTROAMPHETAMINE SACCHARATE, AMPHETAMINE ASPARTATE MONOHYDRATE, DEXTROAMPHETAMINE SULFATE AND AMPHETAMINE SULFATE 2.5; 2.5; 2.5; 2.5 MG/1; MG/1; MG/1; MG/1
10 CAPSULE, EXTENDED RELEASE ORAL EVERY MORNING
Qty: 30 CAPSULE | Refills: 0 | Status: SHIPPED | OUTPATIENT
Start: 2024-09-24 | End: 2024-10-23

## 2024-09-24 NOTE — TELEPHONE ENCOUNTER
Please review. Protocol Failed; No Protocol      Recent fills: 7/8/2024 quantity 30, 8/6/2024 quantity 20, 8/21/2024 quantity 30  Last Rx written: 8/21/2024  Last office visit: 7/8/2024    Future Appointments  Date Type Provider Dept   12/27/24 Appointment Yovany Wang DO [facility]   Showing future appointments within next 150 days with a meds authorizing provider and meeting all other requirements        Requested Prescriptions   Pending Prescriptions Disp Refills    amphetamine-dextroamphetamine ER 10 MG Oral Capsule SR 24 Hr 30 capsule 0     Sig: Take 1 capsule (10 mg total) by mouth every morning.       Controlled Substance Medication Failed - 9/24/2024  2:39 PM        Failed - This medication is a controlled substance - forward to provider to refill         Signed Prescriptions Disp Refills    buPROPion  MG Oral Tablet 24 Hr 90 tablet 3     Sig: Take 1 tablet (300 mg total) by mouth daily. **Appointment needed for further refills.       Psychiatric Non-Scheduled (Anti-Anxiety) Passed - 9/24/2024  2:39 PM        Passed - In person appointment or virtual visit in the past 6 mos or appointment in next 3 mos     Recent Outpatient Visits              2 months ago Examination, routine, over 18 years of age    [facility]any [facility] DO    Office Visit    4 months ago Back pain with right-sided sciatica    [facility], [place], Minhxuyen, PA-C    Office Visit    11 months ago Attention deficit hyperactivity disorder (ADHD), unspecified ADHD type    [facility] Yovany Wang DO    Telemedicine    1 year ago Attention deficit hyperactivity disorder (ADHD), unspecified ADHD type    [facility] Yovany Wang DO    Telemedicine    1 year ago Examination, routine, over 17 years of age    [facility]  Yovany Wang DO    Office Visit          Future Appointments         Provider Department Appt Notes    In 3 months Yovany Wang DO [facility], [facility] medication follow up                    Passed - Depression Screening completed within the past 12 months               Future Appointments         Provider Department Appt Notes    In 3 months Yovany Wang DO [facility] medication follow up          Recent Outpatient Visits              2 months ago Examination, routine, over 17 years of age    [facility] Yovany Wang DO    Office Visit    4 months ago Back pain with right-sided sciatica    [facility], [facility], [place], Minhxuyen, PA-C    Office Visit    11 months ago Attention deficit hyperactivity disorder (ADHD), unspecified ADHD type    [facility] Yovany Wang DO    Telemedicine    1 year ago Attention deficit hyperactivity disorder (ADHD), unspecified ADHD type    [facility] Yovany Wang DO    Telemedicine    1 year ago Examination, routine, over 17 years of age    [facility] Yovany Wang DO    Office Visit

## 2024-09-24 NOTE — TELEPHONE ENCOUNTER
The patient is in need of her medication. She is going on a field tomorrow and needs the medication today.

## 2024-09-24 NOTE — TELEPHONE ENCOUNTER
Covering for Dr Wang. [facility] reviewed today 09/24/24. No red flags. Refilled adderall 10 mg IR as written. Thank you for pending    Barbara Gu MD, 09/24/24, 3:21 PM

## 2024-10-23 DIAGNOSIS — F90.9 ATTENTION DEFICIT HYPERACTIVITY DISORDER (ADHD), UNSPECIFIED ADHD TYPE: ICD-10-CM

## 2024-10-25 RX ORDER — BUPROPION HYDROCHLORIDE 300 MG/1
300 TABLET ORAL DAILY
Qty: 90 TABLET | Refills: 3 | OUTPATIENT
Start: 2024-10-25

## 2024-10-25 RX ORDER — DEXTROAMPHETAMINE SACCHARATE, AMPHETAMINE ASPARTATE MONOHYDRATE, DEXTROAMPHETAMINE SULFATE AND AMPHETAMINE SULFATE 2.5; 2.5; 2.5; 2.5 MG/1; MG/1; MG/1; MG/1
10 CAPSULE, EXTENDED RELEASE ORAL EVERY MORNING
Qty: 30 CAPSULE | Refills: 0 | Status: SHIPPED | OUTPATIENT
Start: 2024-10-25

## 2024-10-25 RX ORDER — NORETHINDRONE ACETATE AND ETHINYL ESTRADIOL 1MG-20(21)
1 KIT ORAL DAILY
Qty: 84 TABLET | Refills: 3 | OUTPATIENT
Start: 2024-10-25

## 2024-10-25 NOTE — TELEPHONE ENCOUNTER
Please review; protocol failed    Future Appointments   Date Time Provider Department Center   12/27/2024  9:00 AM Yovany Wang DO [facility]     Last office visit: 7/8/2024    Recent fill dates: 9/24/24, 8/21/24, and 8/6/24 (qty #20 x 20 days)     For qty of: #30 each for a 30 day supply  Date of  last written prescription:      Date: 9/24/24       Requested Prescriptions   Pending Prescriptions Disp Refills    amphetamine-dextroamphetamine ER 10 MG Oral Capsule SR 24 Hr 30 capsule 0     Sig: Take 1 capsule (10 mg total) by mouth every morning.       Controlled Substance Medication Failed - 10/25/2024 11:59 AM        Failed - This medication is a controlled substance - forward to provider to refill             Future Appointments         Provider Department Appt Notes    In 2 months Yovany Wang DO [facility] medication follow up          Recent Outpatient Visits              3 months ago Examination, routine, over 18 years of age    [facility] Yovany Wang DO    Office Visit    5 months ago Back pain with right-sided sciatica    [facility], [place], Minhxuyen, PA-C    Office Visit    1 year ago Attention deficit hyperactivity disorder (ADHD), unspecified ADHD type    [facility]    [facility]    1 year ago Attention deficit hyperactivity disorder (ADHD), unspecified ADHD type    [facility] Yovany Wang DO    Telemedicine    1 year ago Examination, routine, over 17 years of age    [facility] Yovany Wang DO    Office Visit

## 2024-11-18 DIAGNOSIS — F90.9 ATTENTION DEFICIT HYPERACTIVITY DISORDER (ADHD), UNSPECIFIED ADHD TYPE: ICD-10-CM

## 2024-11-21 RX ORDER — DEXTROAMPHETAMINE SACCHARATE, AMPHETAMINE ASPARTATE MONOHYDRATE, DEXTROAMPHETAMINE SULFATE AND AMPHETAMINE SULFATE 2.5; 2.5; 2.5; 2.5 MG/1; MG/1; MG/1; MG/1
10 CAPSULE, EXTENDED RELEASE ORAL EVERY MORNING
Qty: 30 CAPSULE | Refills: 0 | Status: SHIPPED | OUTPATIENT
Start: 2024-11-21

## 2024-11-21 NOTE — TELEPHONE ENCOUNTER
Please review. Protocol Failed; No Protocol      Recent fills: 8/21/2024, 9/24/2024, 10/25/2024  Last Rx written: 10/25/2024  Last office visit: 7/8/2024    Future Appointments  Date Type Provider Dept   12/27/24 Appointment Yovany Wang DO [facility]   Showing future appointments within next 150 days with a meds authorizing provider and meeting all other requirements        Requested Prescriptions   Pending Prescriptions Disp Refills    amphetamine-dextroamphetamine ER 10 MG Oral Capsule SR 24 Hr 30 capsule 0     Sig: Take 1 capsule (10 mg total) by mouth every morning.       Controlled Substance Medication Failed - 11/21/2024  4:44 PM        Failed - This medication is a controlled substance - forward to provider to refill               Future Appointments         Provider Department Appt Notes    In 1 month Yovany Wang DO [facility] medication follow up          Recent Outpatient Visits              4 months ago Examination, routine, over 17 years of age    [facility] Yovany Wang DO    Office Visit    6 months ago Back pain with right-sided sciatica    [facility], [place], Minhxuyen, PA-C    Office Visit    1 year ago Attention deficit hyperactivity disorder (ADHD), unspecified ADHD type    [facility] Yovany Wang DO    Telemedicine    1 year ago Attention deficit hyperactivity disorder (ADHD), unspecified ADHD type    [facility] Yovany Wang DO    Telemedicine    1 year ago Examination, routine, over 17 years of age    [facility] Yovany Wang DO    Office Visit

## 2024-12-20 DIAGNOSIS — F90.9 ATTENTION DEFICIT HYPERACTIVITY DISORDER (ADHD), UNSPECIFIED ADHD TYPE: ICD-10-CM

## 2024-12-23 RX ORDER — DEXTROAMPHETAMINE SACCHARATE, AMPHETAMINE ASPARTATE MONOHYDRATE, DEXTROAMPHETAMINE SULFATE AND AMPHETAMINE SULFATE 2.5; 2.5; 2.5; 2.5 MG/1; MG/1; MG/1; MG/1
10 CAPSULE, EXTENDED RELEASE ORAL EVERY MORNING
Qty: 30 CAPSULE | Refills: 0 | Status: SHIPPED | OUTPATIENT
Start: 2024-12-23

## 2024-12-23 NOTE — TELEPHONE ENCOUNTER
Patient needs refill of Adderall ER 10 mg sent to local [facility] in [place], as she his home from school now.  Pended for review, Dr Wang please advise?    Last prescribed 11/21/2024, sent to [facility].

## 2024-12-27 RX ORDER — BUPROPION HYDROCHLORIDE 300 MG/1
300 TABLET ORAL DAILY
Qty: 90 TABLET | Refills: 0 | Status: SHIPPED | OUTPATIENT
Start: 2024-12-27

## 2025-01-05 RX ORDER — NORETHINDRONE ACETATE AND ETHINYL ESTRADIOL 1MG-20(21)
1 KIT ORAL DAILY
Qty: 84 TABLET | Refills: 3 | Status: CANCELLED | OUTPATIENT
Start: 2025-01-05

## 2025-01-06 NOTE — TELEPHONE ENCOUNTER
[employer] message sent.   Pharmacy    [facility] - [place] [address] [phone], [phone]      Disp Refills Start End    Norethin Ace-Eth Estrad-FE (HIRAM FE 1/20) 1-20 MG-MCG Oral Tab 84 tablet 3 8/5/2024 --    Sig - Route: Take 1 tablet by mouth daily. - Oral    Sent to pharmacy as: Norethin Ace-Eth Estrad-FE 1-20 MG-MCG Oral Tablet (Hiram FE 1/20)    Notes to Pharmacy: FYI appointment needed for further refills.    E-Prescribing Status: Receipt confirmed by pharmacy (8/5/2024 12:14 PM CDT)

## 2025-01-21 DIAGNOSIS — F90.9 ATTENTION DEFICIT HYPERACTIVITY DISORDER (ADHD), UNSPECIFIED ADHD TYPE: ICD-10-CM

## 2025-01-23 RX ORDER — DEXTROAMPHETAMINE SACCHARATE, AMPHETAMINE ASPARTATE MONOHYDRATE, DEXTROAMPHETAMINE SULFATE AND AMPHETAMINE SULFATE 2.5; 2.5; 2.5; 2.5 MG/1; MG/1; MG/1; MG/1
10 CAPSULE, EXTENDED RELEASE ORAL EVERY MORNING
Qty: 30 CAPSULE | Refills: 0 | OUTPATIENT
Start: 2025-01-23

## 2025-01-23 NOTE — TELEPHONE ENCOUNTER
Patient calling to check on status of generic adderall  ER 10 mg refill request, verified name and date of birth.   Patient is out of medication today.   Medication  pended to run through protocol. Pharmacy verified.

## 2025-01-23 NOTE — TELEPHONE ENCOUNTER
Medication(s) to Refill:   Requested Prescriptions     Pending Prescriptions Disp Refills    amphetamine-dextroamphetamine ER 10 MG Oral Capsule SR 24 Hr 30 capsule 0     Sig: Take 1 capsule (10 mg total) by mouth every morning.     Patient out of medication     Reason for Medication Refill being sent to Provider / Reason Protocol Failed:  [x] Controlled Substance       Quantity: 30  Last Time Medication was Filled:  12/23/2024  Last Time Medication was Written : 12/23/2024      Last Office Visit : 11/27/2024

## 2025-02-19 DIAGNOSIS — F90.9 ATTENTION DEFICIT HYPERACTIVITY DISORDER (ADHD), UNSPECIFIED ADHD TYPE: ICD-10-CM

## 2025-02-25 RX ORDER — DEXTROAMPHETAMINE SACCHARATE, AMPHETAMINE ASPARTATE MONOHYDRATE, DEXTROAMPHETAMINE SULFATE AND AMPHETAMINE SULFATE 2.5; 2.5; 2.5; 2.5 MG/1; MG/1; MG/1; MG/1
10 CAPSULE, EXTENDED RELEASE ORAL DAILY
Qty: 30 CAPSULE | Refills: 0 | Status: SHIPPED | OUTPATIENT
Start: 2025-02-25 | End: 2025-03-27

## 2025-02-25 NOTE — TELEPHONE ENCOUNTER
Please review.  Protocol failed / No protocol.  Recent fills each # 1/23/25 : 12/23/24  Last prescription written: 1/23/25  Last office visit:  12/27/2024    No future appointments.

## 2025-03-23 DIAGNOSIS — F90.9 ATTENTION DEFICIT HYPERACTIVITY DISORDER (ADHD), UNSPECIFIED ADHD TYPE: ICD-10-CM

## 2025-03-26 RX ORDER — DEXTROAMPHETAMINE SACCHARATE, AMPHETAMINE ASPARTATE MONOHYDRATE, DEXTROAMPHETAMINE SULFATE AND AMPHETAMINE SULFATE 2.5; 2.5; 2.5; 2.5 MG/1; MG/1; MG/1; MG/1
10 CAPSULE, EXTENDED RELEASE ORAL DAILY
Qty: 30 CAPSULE | Refills: 0 | Status: SHIPPED | OUTPATIENT
Start: 2025-03-26 | End: 2025-04-25

## 2025-03-26 NOTE — TELEPHONE ENCOUNTER
Please kindly review this medication  Medication request is marked high priority: patient states she is out of medication      [x] FAILS PROTOCOL            [x] Controlled Substance             [] Medication not previously prescribed by Provider            [] Due for appointment- no future appointment scheduled            [] BP reading            [] Labs            [] Depression Screening            [] Asthma (ACT recorded/ACT score)    [] HAS NO PROTOCOL ATTACHED     Recent fill dates: 2/24/25, 1/23/25, and 12/23/25  Date of  last written prescription: 1/23/25   Last written quantity: #30 each and processed as a 30 day supply  LAST OFFICE VISIT: 12/27/24  [] Takes as needed  [x] Takes scheduled daily

## 2025-04-19 DIAGNOSIS — F90.9 ATTENTION DEFICIT HYPERACTIVITY DISORDER (ADHD), UNSPECIFIED ADHD TYPE: ICD-10-CM

## 2025-04-23 RX ORDER — BUPROPION HYDROCHLORIDE 300 MG/1
300 TABLET ORAL DAILY
Qty: 90 TABLET | Refills: 3 | Status: SHIPPED | OUTPATIENT
Start: 2025-04-23

## 2025-04-23 RX ORDER — DEXTROAMPHETAMINE SACCHARATE, AMPHETAMINE ASPARTATE MONOHYDRATE, DEXTROAMPHETAMINE SULFATE AND AMPHETAMINE SULFATE 2.5; 2.5; 2.5; 2.5 MG/1; MG/1; MG/1; MG/1
10 CAPSULE, EXTENDED RELEASE ORAL DAILY
Qty: 30 CAPSULE | Refills: 0 | OUTPATIENT
Start: 2025-04-23 | End: 2025-05-23

## 2025-04-23 RX ORDER — NORETHINDRONE ACETATE AND ETHINYL ESTRADIOL AND FERROUS FUMARATE 1MG-20(21)
1 KIT ORAL DAILY
Qty: 84 TABLET | Refills: 3 | OUTPATIENT
Start: 2025-04-23

## 2025-04-23 NOTE — TELEPHONE ENCOUNTER
Year supply of refills good until 08/05/2025:    Outpatient Medication Detail     Disp Refills Start End    Norethin Ace-Eth Estrad-FE (ANDRIA FE 1/20) 1-20 MG-MCG Oral Tab 84 tablet 3 8/5/2024 --    Sig - Route: Take 1 tablet by mouth daily. - Oral    Sent to pharmacy as: Norethin Ace-Eth Estrad-FE 1-20 MG-MCG Oral Tablet (Andria FE 1/20)    Notes to Pharmacy: FYI appointment needed for further refills.    E-Prescribing Status: Receipt confirmed by pharmacy (8/5/2024 12:14 PM CDT)      Pharmacy    [facility] - [place] - [address] [phone], [phone]

## 2025-04-23 NOTE — TELEPHONE ENCOUNTER
Adderall XR 10mg: called pharmacy and pharmacy stated everything went through and they could get prescription refilled and will text her when ready.

## 2025-05-21 DIAGNOSIS — F90.9 ATTENTION DEFICIT HYPERACTIVITY DISORDER (ADHD), UNSPECIFIED ADHD TYPE: ICD-10-CM

## 2025-05-22 RX ORDER — DEXTROAMPHETAMINE SACCHARATE, AMPHETAMINE ASPARTATE MONOHYDRATE, DEXTROAMPHETAMINE SULFATE AND AMPHETAMINE SULFATE 2.5; 2.5; 2.5; 2.5 MG/1; MG/1; MG/1; MG/1
10 CAPSULE, EXTENDED RELEASE ORAL EVERY MORNING
Qty: 30 CAPSULE | Refills: 0 | Status: SHIPPED | OUTPATIENT
Start: 2025-05-22

## 2025-05-22 NOTE — TELEPHONE ENCOUNTER
Please review. Refill failed protocol.     Recent fills each # 30 : 4/23/25 , 3/26/25 , 2/24/25  Last prescription written: 2/25/25  Last office visit:  12/27/2024    No future appointments.     Sent [employer] message to patient the refill request was forwarded to provider.

## 2025-05-27 DIAGNOSIS — F90.9 ATTENTION DEFICIT HYPERACTIVITY DISORDER (ADHD), UNSPECIFIED ADHD TYPE: ICD-10-CM

## 2025-05-27 RX ORDER — DEXTROAMPHETAMINE SACCHARATE, AMPHETAMINE ASPARTATE MONOHYDRATE, DEXTROAMPHETAMINE SULFATE AND AMPHETAMINE SULFATE 2.5; 2.5; 2.5; 2.5 MG/1; MG/1; MG/1; MG/1
10 CAPSULE, EXTENDED RELEASE ORAL EVERY MORNING
Qty: 30 CAPSULE | Refills: 0 | Status: SHIPPED | OUTPATIENT
Start: 2025-05-27

## 2025-05-27 NOTE — TELEPHONE ENCOUNTER
Dr Wang, please advise    Patient (name and  verified) is calling to request that her adderall be sent to the [facility] in [place] as the [facility] is [place] is out of stock and they told her to have her Rx be sent to this location.    Isotretinoin Counseling: Patient should get monthly blood tests, not donate blood, not drive at night if vision affected, not share medication, and not undergo elective surgery for 6 months after tx completed. Side effects reviewed, pt to contact office should one occur.

## 2025-06-20 DIAGNOSIS — F90.9 ATTENTION DEFICIT HYPERACTIVITY DISORDER (ADHD), UNSPECIFIED ADHD TYPE: ICD-10-CM

## 2025-06-23 RX ORDER — BUPROPION HYDROCHLORIDE 300 MG/1
300 TABLET ORAL DAILY
Qty: 90 TABLET | Refills: 3 | Status: SHIPPED | OUTPATIENT
Start: 2025-06-23

## 2025-06-23 RX ORDER — DEXTROAMPHETAMINE SACCHARATE, AMPHETAMINE ASPARTATE MONOHYDRATE, DEXTROAMPHETAMINE SULFATE AND AMPHETAMINE SULFATE 2.5; 2.5; 2.5; 2.5 MG/1; MG/1; MG/1; MG/1
10 CAPSULE, EXTENDED RELEASE ORAL EVERY MORNING
Qty: 30 CAPSULE | Refills: 0 | Status: SHIPPED | OUTPATIENT
Start: 2025-06-23

## 2025-06-23 NOTE — TELEPHONE ENCOUNTER
Refill passed per Clinic protocol.  Requested Prescriptions   Pending Prescriptions Disp Refills    buPROPion  MG Oral Tablet 24 Hr 90 tablet 3     Sig: Take 1 tablet (300 mg total) by mouth daily.       Psychiatric Non-Scheduled (Anti-Anxiety) Passed - 6/23/2025  2:17 PM        Passed - In person appointment or virtual visit in the past 6 mos or appointment in next 3 mos     Recent Outpatient Visits              5 months ago Attention deficit hyperactivity disorder (ADHD), unspecified ADHD type    [facility] Yovany Wang DO    Office Visit    11 months ago Examination, routine, over 18 years of age    [facility] Yovany Wang DO    Office Visit    1 year ago Back pain with right-sided sciatica    [facility] [place] Nguyen, Minhxuyen, PA-C    Office Visit    1 year ago Attention deficit hyperactivity disorder (ADHD), unspecified ADHD type    [facility] Yovany Wang DO    Telemedicine    1 year ago Attention deficit hyperactivity disorder (ADHD), unspecified ADHD type    [facility] Yovany Wang DO    Telemedicine                      Passed - Depression Screening completed within the past 12 months        Passed - Medication is active on med list          amphetamine-dextroamphetamine ER 10 MG Oral Capsule SR 24 Hr 30 capsule 0     Sig: Take 1 capsule (10 mg total) by mouth every morning.       Controlled Substance Medication Failed - 6/23/2025  2:17 PM        Failed - This medication is a controlled substance - forward to provider to refill        Passed - Medication is active on med list

## 2025-06-23 NOTE — TELEPHONE ENCOUNTER
Please review; protocol failed/No Protocol      Recent Fills: 03/26/2025, 04/23/2025, 05/27/2025 #30 for 30 day supply     Last Rx Written: 05/27/2025    Last Office Visit: 12/27/2024

## 2025-06-24 NOTE — TELEPHONE ENCOUNTER
Refill sent but please let pt know she is due for f/u. Will need appt prior to next refill please. Thank you.

## 2025-07-17 DIAGNOSIS — F90.9 ATTENTION DEFICIT HYPERACTIVITY DISORDER (ADHD), UNSPECIFIED ADHD TYPE: ICD-10-CM

## 2025-07-18 RX ORDER — DEXTROAMPHETAMINE SACCHARATE, AMPHETAMINE ASPARTATE MONOHYDRATE, DEXTROAMPHETAMINE SULFATE AND AMPHETAMINE SULFATE 2.5; 2.5; 2.5; 2.5 MG/1; MG/1; MG/1; MG/1
10 CAPSULE, EXTENDED RELEASE ORAL EVERY MORNING
Qty: 30 CAPSULE | Refills: 0 | Status: SHIPPED | OUTPATIENT
Start: 2025-07-18

## 2025-07-18 NOTE — TELEPHONE ENCOUNTER
Please review; protocol failed/ has no protocol        Recent fills: 06/24/2025,05/27/2025,04/23/2025  Last Rx written: 06/23/2025  Last Office Visit: 12/27/2024    Recent Visits  Date Type Provider Dept   12/27/24 Office Visit Yovany Wang DO [facility]     Future Appointments  Date Type Provider Dept   07/25/25 Appointment Yovany Wang DO [facility]   Showing future appointments within next 150 days with a meds authorizing provider and meeting all other requirements